# Patient Record
Sex: FEMALE | Race: WHITE | NOT HISPANIC OR LATINO | URBAN - METROPOLITAN AREA
[De-identification: names, ages, dates, MRNs, and addresses within clinical notes are randomized per-mention and may not be internally consistent; named-entity substitution may affect disease eponyms.]

---

## 2017-10-30 ENCOUNTER — HISTORICAL (OUTPATIENT)
Dept: RADIOLOGY | Facility: HOSPITAL | Age: 67
End: 2017-10-30

## 2020-07-24 LAB
ABS NEUT (OLG): 4.6 X10(3)/MCL (ref 2.1–9.2)
ALBUMIN SERPL-MCNC: 4.5 GM/DL (ref 3.4–5)
ALBUMIN/GLOB SERPL: 1.7 RATIO (ref 1.1–2)
ALP SERPL-CCNC: 50 UNIT/L (ref 40–150)
ALT SERPL-CCNC: 27 UNIT/L (ref 0–55)
APTT PPP: 24.3 SECOND(S) (ref 23.2–33.7)
AST SERPL-CCNC: 19 UNIT/L (ref 5–34)
BASOPHILS # BLD AUTO: 0 X10(3)/MCL (ref 0–0.2)
BASOPHILS NFR BLD AUTO: 0 %
BILIRUB SERPL-MCNC: 0.6 MG/DL
BILIRUBIN DIRECT+TOT PNL SERPL-MCNC: 0.2 MG/DL (ref 0–0.5)
BILIRUBIN DIRECT+TOT PNL SERPL-MCNC: 0.4 MG/DL (ref 0–0.8)
BUN SERPL-MCNC: 16.6 MG/DL (ref 9.8–20.1)
CALCIUM SERPL-MCNC: 9.8 MG/DL (ref 8.4–10.2)
CHLORIDE SERPL-SCNC: 103 MMOL/L (ref 98–107)
CO2 SERPL-SCNC: 31 MMOL/L (ref 23–31)
CREAT SERPL-MCNC: 0.73 MG/DL (ref 0.55–1.02)
EOSINOPHIL # BLD AUTO: 0 X10(3)/MCL (ref 0–0.9)
EOSINOPHIL NFR BLD AUTO: 1 %
ERYTHROCYTE [DISTWIDTH] IN BLOOD BY AUTOMATED COUNT: 13.6 % (ref 11.5–17)
GLOBULIN SER-MCNC: 2.7 GM/DL (ref 2.4–3.5)
GLUCOSE SERPL-MCNC: 82 MG/DL (ref 82–115)
HCT VFR BLD AUTO: 46.6 % (ref 37–47)
HGB BLD-MCNC: 15 GM/DL (ref 12–16)
INR PPP: 0.9 (ref 0–1.3)
LYMPHOCYTES # BLD AUTO: 2 X10(3)/MCL (ref 0.6–4.6)
LYMPHOCYTES NFR BLD AUTO: 26 %
MCH RBC QN AUTO: 29.3 PG (ref 27–31)
MCHC RBC AUTO-ENTMCNC: 32.2 GM/DL (ref 33–36)
MCV RBC AUTO: 91 FL (ref 80–94)
MONOCYTES # BLD AUTO: 0.9 X10(3)/MCL (ref 0.1–1.3)
MONOCYTES NFR BLD AUTO: 11 %
NEUTROPHILS # BLD AUTO: 4.6 X10(3)/MCL (ref 2.1–9.2)
NEUTROPHILS NFR BLD AUTO: 61 %
PLATELET # BLD AUTO: 245 X10(3)/MCL (ref 130–400)
PMV BLD AUTO: 11.5 FL (ref 9.4–12.4)
POTASSIUM SERPL-SCNC: 4.9 MMOL/L (ref 3.5–5.1)
PROT SERPL-MCNC: 7.2 GM/DL (ref 5.8–7.6)
PROTHROMBIN TIME: 12.1 SECOND(S) (ref 11.1–13.7)
RBC # BLD AUTO: 5.12 X10(6)/MCL (ref 4.2–5.4)
SODIUM SERPL-SCNC: 141 MMOL/L (ref 136–145)
WBC # SPEC AUTO: 7.5 X10(3)/MCL (ref 4.5–11.5)

## 2020-07-29 ENCOUNTER — HISTORICAL (OUTPATIENT)
Dept: SURGERY | Facility: HOSPITAL | Age: 70
End: 2020-07-29

## 2022-04-30 NOTE — OP NOTE
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    PROCEDURE:  Blepharoptosis repair utilizing levator advancement.    PROCEDURE IN DETAIL:  The patient was brought to the operating room and placed in supine position.  After achieving intravenous sedation with local anesthesia with infiltration of 2% lidocaine with 1:100,000 epinephrine, the face was prepped and draped for surgery.  Addressing the left eye first, a predetermined amount of upper eyelid skin was removed, a strip of orbicularis oculi muscle taken, and hemostasis achieved with bipolar cautery.  Then fat in the nasal compartment was removed using the Colorado needle.  Once this was done, the septum was opened retracting the central fat pad superiorly, exposing the levator aponeurosis which was then shortened with the Colorado needle.  Then 6-0 Vicryl suture was used to reapproximate the freshened edge of the levator to the anterior edge of the tarsus with partial-thickness bites.  This was done centrally, nasally, and temporally.  Then 5-0 fast-absorbing gut sutures were used to close the lid in running fashion.  The same procedure was done on the opposite side with similar findings.  The area was then cleaned with saline and dressed with erythromycin ophthalmic ointment and cold compresses applied.  The patient tolerated the procedure well, was awakened in the operating room and brought to recovery room in stable condition.        ______________________________  Mikal Gabriel MD    JJJ/SS  DD:  07/29/2020  Time:  10:02AM  DT:  07/29/2020  Time:  10:22AM  Job #:  [id number]

## 2022-05-25 ENCOUNTER — OFFICE VISIT (OUTPATIENT)
Dept: ORTHOPEDICS | Facility: CLINIC | Age: 72
End: 2022-05-25
Payer: MEDICARE

## 2022-05-25 ENCOUNTER — HOSPITAL ENCOUNTER (OUTPATIENT)
Dept: RADIOLOGY | Facility: CLINIC | Age: 72
Discharge: HOME | End: 2022-05-25
Attending: ORTHOPAEDIC SURGERY
Payer: MEDICARE

## 2022-05-25 VITALS
SYSTOLIC BLOOD PRESSURE: 154 MMHG | DIASTOLIC BLOOD PRESSURE: 88 MMHG | WEIGHT: 154 LBS | HEIGHT: 60 IN | HEART RATE: 71 BPM | BODY MASS INDEX: 30.23 KG/M2

## 2022-05-25 DIAGNOSIS — M25.561 ACUTE PAIN OF BOTH KNEES: ICD-10-CM

## 2022-05-25 DIAGNOSIS — M25.562 ACUTE PAIN OF BOTH KNEES: ICD-10-CM

## 2022-05-25 DIAGNOSIS — M17.0 PRIMARY OSTEOARTHRITIS OF BOTH KNEES: Primary | ICD-10-CM

## 2022-05-25 PROCEDURE — 99203 OFFICE O/P NEW LOW 30 MIN: CPT | Mod: ,,, | Performed by: NURSE PRACTITIONER

## 2022-05-25 PROCEDURE — 99203 PR OFFICE/OUTPT VISIT, NEW, LEVL III, 30-44 MIN: ICD-10-PCS | Mod: ,,, | Performed by: NURSE PRACTITIONER

## 2022-05-25 PROCEDURE — 73565 PR  X-RAY KNEE BILAT STANDING: ICD-10-PCS | Mod: ,,, | Performed by: NURSE PRACTITIONER

## 2022-05-25 PROCEDURE — 73565 X-RAY EXAM OF KNEES: CPT | Mod: ,,, | Performed by: NURSE PRACTITIONER

## 2022-05-25 RX ORDER — AMLODIPINE BESYLATE 10 MG/1
10 TABLET ORAL DAILY
COMMUNITY
Start: 2022-04-14

## 2022-05-25 RX ORDER — CLONIDINE HYDROCHLORIDE 0.2 MG/1
0.2 TABLET ORAL 2 TIMES DAILY
COMMUNITY
Start: 2022-05-16

## 2022-05-25 NOTE — PROGRESS NOTES
Chief Complaint:   Chief Complaint   Patient presents with    Right Knee - Pain    Left Knee - Pain    Pain     CHINO knee pain no prior injury or sx, has tried injections before states it doesnt help much     History of present illness:  She is a pleasant 72-year-old who presents with bilateral knee pain.  She has a known history of osteoarthritis in her right knee.  She has previously been treated with steroid and viscosupplementation without much relief. Right knee pain is global within the knee. It is worse with activity. It is somewhat better with rest.  Her left knee pain is global within the knee.  It is worse with any activity.  It is somewhat better with rest.  Both of her knees are affecting her ADLs.     History reviewed. No pertinent past medical history.    Past Surgical History:   Procedure Laterality Date    DENTAL SURGERY      eye lid surgery      nail fungal surgery         Current Outpatient Medications   Medication Sig    amLODIPine (NORVASC) 10 MG tablet Take 10 mg by mouth once daily.    cloNIDine (CATAPRES) 0.2 MG tablet Take 0.2 mg by mouth 2 (two) times daily.     No current facility-administered medications for this visit.       Review of patient's allergies indicates:   Allergen Reactions    Sulfa (sulfonamide antibiotics)        History reviewed. No pertinent family history.    Social History     Socioeconomic History    Marital status:    Tobacco Use    Smoking status: Never Smoker    Smokeless tobacco: Never Used       Review of Systems:    Constitution:   Denies chills, fever, and sweats.  HENT:   Denies headaches or blurry vision.  Cardiovascular:  Denies chest pain or irregular heart beat.  Respiratory:   Denies cough or shortness of breath.  Gastrointestinal:  Denies abdominal pain, nausea, or vomiting.  Musculoskeletal:   Denies muscle cramps.  Neurological:   Denies dizziness or focal weakness.  Psychiatric/Behavior: Normal mental status.  Hematology/Lymph:  Denies  bleeding problem or easy bruising/bleeding.  Skin:    Denies rash or suspicious lesions.    Examination:    Vital Signs:    Vitals:    05/25/22 1021 05/25/22 1022   BP: (!) 154/88    Pulse: 71    Weight: 69.9 kg (154 lb)    Height: 5' (1.524 m)    PainSc:    5       Body mass index is 30.08 kg/m².    Constitution:   Well-developed, well nourished patient in no acute distress.  Neurological:   Alert and oriented x 3 and cooperative to examination.     Psychiatric/Behavior: Normal mental status.  Respiratory:   No shortness of breath.  Eyes:    Extraoccular muscles intact  Skin:    No scars, rash or suspicious lesions.    MSK:   Standing exam  stance: normal alignment, no significant leg-length discrepancy  gait: antalgic limp    Knee examination  - General comments: unremarkable appearance    - Tenderness: none to palpation    Knee                  RIGHT    LEFT  Skin:                  Intact      Intact  ROM:                 0-130      0-130  Effusion:             Neg        Neg  MJL TTP:           Neg         Neg  LJL TTP:            Neg         Neg  Darshan:         Neg         Neg  Pat crep:            +             Neg  Patella TTPs:     Neg         Neg  Patella grind:      Neg        Neg  Lachman:           Neg        Neg  Pivot shift:          Neg        Neg  Valgus stress:    Neg        Neg  Varus stress:      Neg        Neg  Posterior drawer: Neg       Neg    N-V intact intact  Hip: nml nml    Lower extremity edema:Negative       Imaging: X-rays ordered and images interpreted today personally by me of her knees show advanced OA of her right knee and mild OA of her left knee.      Assessment: Primary osteoarthritis of right knee  -     X-Ray Knee 3 View Bilateral; Future; Expected date: 05/25/2022        Plan: Treatment options were discussed such as Iovera vs surgery.  I think her best surgical option would be total knee arthroplasties. She will call if is she'd like to pursue.

## 2022-06-17 ENCOUNTER — HOSPITAL ENCOUNTER (OUTPATIENT)
Dept: RADIOLOGY | Facility: HOSPITAL | Age: 72
Discharge: HOME | End: 2022-06-17
Attending: OBSTETRICS & GYNECOLOGY
Payer: MEDICARE

## 2022-06-17 DIAGNOSIS — Z12.31 ENCOUNTER FOR SCREENING MAMMOGRAM FOR BREAST CANCER: ICD-10-CM

## 2022-06-17 PROCEDURE — 77063 BREAST TOMOSYNTHESIS BI: CPT | Mod: 26,,, | Performed by: STUDENT IN AN ORGANIZED HEALTH CARE EDUCATION/TRAINING PROGRAM

## 2022-06-17 PROCEDURE — 77067 MAMMO DIGITAL SCREENING BILAT WITH TOMO: ICD-10-PCS | Mod: 26,,, | Performed by: STUDENT IN AN ORGANIZED HEALTH CARE EDUCATION/TRAINING PROGRAM

## 2022-06-17 PROCEDURE — 77067 SCR MAMMO BI INCL CAD: CPT | Mod: 26,,, | Performed by: STUDENT IN AN ORGANIZED HEALTH CARE EDUCATION/TRAINING PROGRAM

## 2022-06-17 PROCEDURE — 77063 MAMMO DIGITAL SCREENING BILAT WITH TOMO: ICD-10-PCS | Mod: 26,,, | Performed by: STUDENT IN AN ORGANIZED HEALTH CARE EDUCATION/TRAINING PROGRAM

## 2022-06-17 PROCEDURE — 77067 SCR MAMMO BI INCL CAD: CPT | Mod: TC

## 2023-03-07 DIAGNOSIS — R93.0 ABNORMAL MRI OF THE HEAD: Primary | ICD-10-CM

## 2023-03-29 ENCOUNTER — TELEPHONE (OUTPATIENT)
Dept: NEUROSURGERY | Facility: CLINIC | Age: 73
End: 2023-03-29
Payer: MEDICARE

## 2023-03-29 DIAGNOSIS — R93.0 ABNORMAL MRI OF HEAD: Primary | ICD-10-CM

## 2023-03-29 DIAGNOSIS — M54.2 CERVICALGIA: Primary | ICD-10-CM

## 2023-03-29 NOTE — TELEPHONE ENCOUNTER
I spoke to Renetta in the office.  She stated it was ok to add the MRI and XR cervical.  She also clarified that the patient is to come back with a CT head w/wo not MRI pituitary because he is needing to look at the cyst mentioned in her last MRI brain.  I faxed the orders to [facility] and the patient will schedule prior to her appointment on 4/10/23.  Lizzette can you please pull her older records from [facility] when you get a chance please.  DOMENICO

## 2023-04-10 ENCOUNTER — OFFICE VISIT (OUTPATIENT)
Dept: NEUROSURGERY | Facility: CLINIC | Age: 73
End: 2023-04-10
Payer: MEDICARE

## 2023-04-10 VITALS
RESPIRATION RATE: 18 BRPM | BODY MASS INDEX: 32 KG/M2 | DIASTOLIC BLOOD PRESSURE: 73 MMHG | HEART RATE: 69 BPM | HEIGHT: 60 IN | SYSTOLIC BLOOD PRESSURE: 131 MMHG | WEIGHT: 163 LBS

## 2023-04-10 DIAGNOSIS — M47.22 CERVICAL SPONDYLOSIS WITH RADICULOPATHY: ICD-10-CM

## 2023-04-10 DIAGNOSIS — G93.9 BRAIN LESION: Primary | ICD-10-CM

## 2023-04-10 PROCEDURE — 99214 PR OFFICE/OUTPT VISIT, EST, LEVL IV, 30-39 MIN: ICD-10-PCS | Mod: ,,, | Performed by: NEUROLOGICAL SURGERY

## 2023-04-10 PROCEDURE — 99214 OFFICE O/P EST MOD 30 MIN: CPT | Mod: ,,, | Performed by: NEUROLOGICAL SURGERY

## 2023-04-10 RX ORDER — LOSARTAN POTASSIUM 100 MG/1
TABLET ORAL NIGHTLY
COMMUNITY

## 2023-04-10 RX ORDER — IBUPROFEN 200 MG
200 TABLET ORAL EVERY 6 HOURS PRN
COMMUNITY

## 2023-04-10 RX ORDER — TRIAMTERENE/HYDROCHLOROTHIAZID 37.5-25 MG
1 TABLET ORAL DAILY
COMMUNITY
Start: 2023-01-31

## 2023-04-10 NOTE — PROGRESS NOTES
[facility]  Neurosurgery  Established Patient      Thelma Andre   [id number]   1950       SUBJECTIVE:     History of Present Illness:  Patient is a 73 y.o. female who presents for a 1 year follow up for a cystic lesion involving the bony posterior sellar wall.  The lesion was an incidental finding on cervical MRI performed in October 2021 for neck pain and radiculopathy.  She continues to complain of pain in the neck with radiation into bilateral shoulders.  Her pain is worse on the left side.  She is unable to sleep with her arms above her head due to her pain.  Her symptoms have gradually worsened over time.  She has treated with a chiropractor, which initially helped, but the relief is not lasting as long as it once did.  She was seeing Dr. Couch for pain management until he retired.  She had good results with rhizotomies in the past for her lower back.  She has not had any injections in the neck.        Past Medical History:   Diagnosis Date    Abnormal MRI of the head     Arthritis     Cervical spondylosis with radiculopathy     History of skin cancer     HTN (hypertension)       Past Surgical History:   Procedure Laterality Date    BELPHAROPTOSIS REPAIR      DENTAL SURGERY      REFRACTIVE SURGERY  2007    REPAIR OF NAIL BED      FUNGUS    TOTAL KNEE ARTHROPLASTY Right       Outpatient Encounter Medications as of 4/10/2023   Medication Sig Dispense Refill    amLODIPine (NORVASC) 10 MG tablet Take 10 mg by mouth once daily.      BETA 1,3 GLUCAN, BULK, MISC by Misc.(Non-Drug; Combo Route) route.      ibuprofen (ADVIL,MOTRIN) 200 MG tablet Take 200 mg by mouth every 6 (six) hours as needed for Pain.      losartan (COZAAR) 100 MG tablet every evening.      triamterene-hydrochlorothiazide 37.5-25 mg (MAXZIDE-25) 37.5-25 mg per tablet Take 1 tablet by mouth once daily.      UNABLE TO FIND medication name: Nutrafol      vitamin D3/vitamin K2, MK4, (K2 PLUS D3 ORAL) Take by mouth.      cloNIDine  (CATAPRES) 0.2 MG tablet Take 0.2 mg by mouth 2 (two) times daily.       No facility-administered encounter medications on file as of 4/10/2023.      Review of patient's allergies indicates:   Allergen Reactions    Hydroxychloroquine     Losartan      Other reaction(s): Cough    Sulfa (sulfonamide antibiotics)       Social History     Tobacco Use    Smoking status: Never    Smokeless tobacco: Never   Substance Use Topics    Alcohol use: Not Currently      Family History   Problem Relation Age of Onset    Lung disease Mother     Thyroid disease Mother     Heart disease Father     Lung disease Father     Hypertension Sister     Thyroid disease Sister     Heart disease Brother     Hypertension Brother     Hypertension Son     Hypertension Daughter        Review of Systems:    Pertinent items are noted in HPI.      OBJECTIVE:     Vital Signs  Pulse: 69  Resp: 18  BP: 131/73  Pain Score:   4  Height: 5' (152.4 cm)  Weight: 73.9 kg (163 lb)  Body mass index is 31.83 kg/m².    General:  healthy, alert, no distress, cooperative    Head:  Normocephalic, without obvious abnormality, atraumatic    Lungs:   Breathing is quiet, non-lablored    Neurological:    Oriented to Person, Place, Time   Speech:  normal  Memory, cognition, and affect are appropriate.  Extraocular movements are intact.  Movements of facial expression are intact and symmetric.  Motor Strength: Moves all extremities spontaneously with good tone.  No abnormal movements seen.  Gait is normal    MRI of the brain from 04/06/2023 shows no change in the size, configuration or characteristics of the cystic bony lesion of the dorsum sellae.  I think this is a benign process given the lack of change over a couple of years of serial imaging.  I believe we can forego further imaging    ASSESSMENT/PLAN:     1. Brain lesion    2. Cervical spondylosis with radiculopathy  - Ambulatory referral/consult to Pain Clinic; Future     -Follow up as needed        Dr. Alfredo VALENTIN  Soumya, personally performed the services described in this documentation. All medical record entries made by the scribe, Rebeka Casarez RN, were at my direction and in my presence.  I have reviewed the chart and agree that the record reflects my personal performance and is accurate and complete. Alfredo Dooley MD.  1:21 PM 04/10/2023       Alfredo Dooley MD FACS [facility]

## 2023-04-18 ENCOUNTER — TELEPHONE (OUTPATIENT)
Dept: NEUROSURGERY | Facility: CLINIC | Age: 73
End: 2023-04-18
Payer: MEDICARE

## 2023-10-16 DIAGNOSIS — Z12.31 OTHER SCREENING MAMMOGRAM: Primary | ICD-10-CM

## 2023-11-14 ENCOUNTER — HOSPITAL ENCOUNTER (OUTPATIENT)
Dept: RADIOLOGY | Facility: HOSPITAL | Age: 73
Discharge: HOME | End: 2023-11-14
Attending: OBSTETRICS & GYNECOLOGY
Payer: MEDICARE

## 2023-11-14 DIAGNOSIS — N64.4 BREAST PAIN: ICD-10-CM

## 2023-11-14 PROCEDURE — 77062 BREAST TOMOSYNTHESIS BI: CPT | Mod: 26,,, | Performed by: RADIOLOGY

## 2023-11-14 PROCEDURE — 76642 ULTRASOUND BREAST LIMITED: CPT | Mod: 26,RT,, | Performed by: RADIOLOGY

## 2023-11-14 PROCEDURE — 77062 MAMMO DIGITAL DIAGNOSTIC BILAT WITH TOMO: ICD-10-PCS | Mod: 26,,, | Performed by: RADIOLOGY

## 2023-11-14 PROCEDURE — 76642 ULTRASOUND BREAST LIMITED: CPT | Mod: TC,RT

## 2023-11-14 PROCEDURE — 77066 MAMMO DIGITAL DIAGNOSTIC BILAT WITH TOMO: ICD-10-PCS | Mod: 26,,, | Performed by: RADIOLOGY

## 2023-11-14 PROCEDURE — 77066 DX MAMMO INCL CAD BI: CPT | Mod: 26,,, | Performed by: RADIOLOGY

## 2023-11-14 PROCEDURE — 76642 US BREAST RIGHT LIMITED: ICD-10-PCS | Mod: 26,RT,, | Performed by: RADIOLOGY

## 2023-11-14 PROCEDURE — 77062 BREAST TOMOSYNTHESIS BI: CPT | Mod: TC

## 2024-11-21 ENCOUNTER — HOSPITAL ENCOUNTER (OUTPATIENT)
Dept: RADIOLOGY | Facility: HOSPITAL | Age: 74
Discharge: HOME | End: 2024-11-21
Attending: OBSTETRICS & GYNECOLOGY
Payer: MEDICARE

## 2024-11-21 DIAGNOSIS — M81.0 SENILE OSTEOPOROSIS: ICD-10-CM

## 2024-11-21 DIAGNOSIS — Z12.31 ENCOUNTER FOR SCREENING MAMMOGRAM FOR BREAST CANCER: ICD-10-CM

## 2024-11-21 PROCEDURE — 77063 BREAST TOMOSYNTHESIS BI: CPT | Mod: TC

## 2024-11-21 PROCEDURE — 77080 DXA BONE DENSITY AXIAL: CPT | Mod: TC

## 2024-11-21 PROCEDURE — 77063 BREAST TOMOSYNTHESIS BI: CPT | Mod: 26,,, | Performed by: RADIOLOGY

## 2024-11-21 PROCEDURE — 77067 SCR MAMMO BI INCL CAD: CPT | Mod: 26,,, | Performed by: RADIOLOGY
